# Patient Record
Sex: FEMALE | Race: WHITE | NOT HISPANIC OR LATINO | Employment: UNEMPLOYED | URBAN - METROPOLITAN AREA
[De-identification: names, ages, dates, MRNs, and addresses within clinical notes are randomized per-mention and may not be internally consistent; named-entity substitution may affect disease eponyms.]

---

## 2020-10-20 DIAGNOSIS — Z12.31 ENCOUNTER FOR SCREENING MAMMOGRAM FOR BREAST CANCER: Primary | ICD-10-CM

## 2022-08-24 ENCOUNTER — OFFICE VISIT (OUTPATIENT)
Dept: FAMILY MEDICINE | Facility: CLINIC | Age: 47
End: 2022-08-24
Payer: COMMERCIAL

## 2022-08-24 VITALS — SYSTOLIC BLOOD PRESSURE: 130 MMHG | DIASTOLIC BLOOD PRESSURE: 62 MMHG | WEIGHT: 184.38 LBS

## 2022-08-24 DIAGNOSIS — F17.200 TOBACCO DEPENDENCY: ICD-10-CM

## 2022-08-24 DIAGNOSIS — I10 HYPERTENSION, UNSPECIFIED TYPE: ICD-10-CM

## 2022-08-24 DIAGNOSIS — R05.3 CHRONIC COUGH: ICD-10-CM

## 2022-08-24 DIAGNOSIS — M79.605 LEG PAIN, BILATERAL: ICD-10-CM

## 2022-08-24 DIAGNOSIS — Z12.31 BREAST CANCER SCREENING BY MAMMOGRAM: ICD-10-CM

## 2022-08-24 DIAGNOSIS — Z00.00 ANNUAL PHYSICAL EXAM: Primary | ICD-10-CM

## 2022-08-24 DIAGNOSIS — M79.604 LEG PAIN, BILATERAL: ICD-10-CM

## 2022-08-24 DIAGNOSIS — Z12.11 COLON CANCER SCREENING: ICD-10-CM

## 2022-08-24 PROCEDURE — 3078F PR MOST RECENT DIASTOLIC BLOOD PRESSURE < 80 MM HG: ICD-10-PCS | Mod: CPTII,S$GLB,,

## 2022-08-24 PROCEDURE — 3078F DIAST BP <80 MM HG: CPT | Mod: CPTII,S$GLB,,

## 2022-08-24 PROCEDURE — 1159F PR MEDICATION LIST DOCUMENTED IN MEDICAL RECORD: ICD-10-PCS | Mod: CPTII,S$GLB,,

## 2022-08-24 PROCEDURE — 1160F RVW MEDS BY RX/DR IN RCRD: CPT | Mod: CPTII,S$GLB,,

## 2022-08-24 PROCEDURE — 1160F PR REVIEW ALL MEDS BY PRESCRIBER/CLIN PHARMACIST DOCUMENTED: ICD-10-PCS | Mod: CPTII,S$GLB,,

## 2022-08-24 PROCEDURE — 1159F MED LIST DOCD IN RCRD: CPT | Mod: CPTII,S$GLB,,

## 2022-08-24 PROCEDURE — 99386 PR PREVENTIVE VISIT,NEW,40-64: ICD-10-PCS | Mod: S$GLB,,,

## 2022-08-24 PROCEDURE — 4010F ACE/ARB THERAPY RXD/TAKEN: CPT | Mod: CPTII,S$GLB,,

## 2022-08-24 PROCEDURE — 3075F PR MOST RECENT SYSTOLIC BLOOD PRESS GE 130-139MM HG: ICD-10-PCS | Mod: CPTII,S$GLB,,

## 2022-08-24 PROCEDURE — 99386 PREV VISIT NEW AGE 40-64: CPT | Mod: S$GLB,,,

## 2022-08-24 PROCEDURE — 99999 PR PBB SHADOW E&M-EST. PATIENT-LVL III: ICD-10-PCS | Mod: PBBFAC,,,

## 2022-08-24 PROCEDURE — 99999 PR PBB SHADOW E&M-EST. PATIENT-LVL III: CPT | Mod: PBBFAC,,,

## 2022-08-24 PROCEDURE — 3075F SYST BP GE 130 - 139MM HG: CPT | Mod: CPTII,S$GLB,,

## 2022-08-24 PROCEDURE — 4010F PR ACE/ARB THEARPY RXD/TAKEN: ICD-10-PCS | Mod: CPTII,S$GLB,,

## 2022-08-24 RX ORDER — ALBUTEROL SULFATE 90 UG/1
2 AEROSOL, METERED RESPIRATORY (INHALATION) EVERY 6 HOURS PRN
Qty: 18 G | Refills: 2 | OUTPATIENT
Start: 2022-08-24 | End: 2023-08-20

## 2022-08-24 RX ORDER — LISINOPRIL AND HYDROCHLOROTHIAZIDE 10; 12.5 MG/1; MG/1
1 TABLET ORAL DAILY
COMMUNITY
Start: 2022-07-19 | End: 2022-08-24 | Stop reason: SDUPTHER

## 2022-08-24 RX ORDER — PNV NO.95/FERROUS FUM/FOLIC AC 28MG-0.8MG
100 TABLET ORAL DAILY
COMMUNITY

## 2022-08-24 RX ORDER — CYCLOBENZAPRINE HCL 10 MG
10 TABLET ORAL NIGHTLY PRN
Qty: 30 TABLET | Refills: 2 | Status: SHIPPED | OUTPATIENT
Start: 2022-08-24 | End: 2022-09-23

## 2022-08-24 RX ORDER — BUPRENORPHINE HYDROCHLORIDE 8 MG/1
8 TABLET SUBLINGUAL 3 TIMES DAILY
COMMUNITY
Start: 2022-08-09

## 2022-08-24 RX ORDER — CLONAZEPAM 1 MG/1
1 TABLET ORAL 2 TIMES DAILY
COMMUNITY
Start: 2022-08-09 | End: 2023-08-20

## 2022-08-24 RX ORDER — LISINOPRIL AND HYDROCHLOROTHIAZIDE 10; 12.5 MG/1; MG/1
1 TABLET ORAL DAILY
Qty: 90 TABLET | Refills: 3 | Status: SHIPPED | OUTPATIENT
Start: 2022-08-24 | End: 2023-07-28 | Stop reason: SDUPTHER
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
## 2022-09-22 ENCOUNTER — HOSPITAL ENCOUNTER (OUTPATIENT)
Dept: RADIOLOGY | Facility: HOSPITAL | Age: 47
Discharge: HOME | End: 2022-09-22
Payer: COMMERCIAL

## 2022-09-22 ENCOUNTER — CLINICAL SUPPORT (OUTPATIENT)
Dept: SMOKING CESSATION | Facility: CLINIC | Age: 47
End: 2022-09-22

## 2022-09-22 DIAGNOSIS — Z12.31 BREAST CANCER SCREENING BY MAMMOGRAM: ICD-10-CM

## 2022-09-22 DIAGNOSIS — F17.210 HEAVY SMOKER (MORE THAN 20 CIGARETTES PER DAY): Primary | ICD-10-CM

## 2022-09-22 PROCEDURE — 77063 MAMMO DIGITAL SCREENING BILAT WITH TOMO: ICD-10-PCS | Mod: 26,,, | Performed by: RADIOLOGY

## 2022-09-22 PROCEDURE — 77063 BREAST TOMOSYNTHESIS BI: CPT | Mod: TC

## 2022-09-22 PROCEDURE — 77067 SCR MAMMO BI INCL CAD: CPT | Mod: 26,,, | Performed by: RADIOLOGY

## 2022-09-22 PROCEDURE — 77067 MAMMO DIGITAL SCREENING BILAT WITH TOMO: ICD-10-PCS | Mod: 26,,, | Performed by: RADIOLOGY

## 2022-09-22 PROCEDURE — 99404 PR PREVENT COUNSEL,INDIV,60 MIN: ICD-10-PCS | Mod: S$GLB,,, | Performed by: NURSE PRACTITIONER

## 2022-09-22 PROCEDURE — 99404 PREV MED CNSL INDIV APPRX 60: CPT | Mod: S$GLB,,, | Performed by: NURSE PRACTITIONER

## 2022-09-22 PROCEDURE — 77063 BREAST TOMOSYNTHESIS BI: CPT | Mod: 26,,, | Performed by: RADIOLOGY

## 2022-09-22 RX ORDER — IBUPROFEN 200 MG
1 TABLET ORAL DAILY
Qty: 28 PATCH | Refills: 0 | Status: SHIPPED | OUTPATIENT
Start: 2022-09-22 | End: 2023-08-17

## 2022-09-22 RX ORDER — VARENICLINE TARTRATE 1 MG/1
1 TABLET, FILM COATED ORAL 2 TIMES DAILY
Qty: 56 TABLET | Refills: 0 | Status: SHIPPED | OUTPATIENT
Start: 2022-09-22

## 2022-09-22 NOTE — Clinical Note
Patient seen with her friend at her side for her intake appointment with smoking cessation. They are trying to stop smoking together This is her first attempt to stop smoking through our program. She is a cigarette smoker of 2 PPD X 41 years. She is motivated to quit the use of tobacco and has agreed to attend our 6 week tobacco cessation program.

## 2022-09-22 NOTE — PROGRESS NOTES
Patient and her friend, Dagmar are attempting to stop smoking together. They were seen together for the intake appointment with smoking cessation.

## 2022-09-23 ENCOUNTER — TELEPHONE (OUTPATIENT)
Dept: FAMILY MEDICINE | Facility: CLINIC | Age: 47
End: 2022-09-23
Payer: COMMERCIAL

## 2022-09-23 NOTE — TELEPHONE ENCOUNTER
----- Message from Genny Glynn NP sent at 9/22/2022  5:13 PM CDT -----   Your mammogram was normal. Recommended to repeat in one year.     Have a great day!    FARRAH Horan

## 2022-09-27 ENCOUNTER — TELEPHONE (OUTPATIENT)
Dept: FAMILY MEDICINE | Facility: CLINIC | Age: 47
End: 2022-09-27
Payer: COMMERCIAL

## 2022-10-06 ENCOUNTER — CLINICAL SUPPORT (OUTPATIENT)
Dept: SMOKING CESSATION | Facility: CLINIC | Age: 47
End: 2022-10-06

## 2022-10-06 DIAGNOSIS — F17.210 HEAVY SMOKER (MORE THAN 20 CIGARETTES PER DAY): Primary | ICD-10-CM

## 2022-10-06 PROCEDURE — 99402 PR PREVENT COUNSEL,INDIV,30 MIN: ICD-10-PCS | Mod: S$GLB,,, | Performed by: NURSE PRACTITIONER

## 2022-10-06 PROCEDURE — 99402 PREV MED CNSL INDIV APPRX 30: CPT | Mod: S$GLB,,, | Performed by: NURSE PRACTITIONER

## 2022-10-06 NOTE — Clinical Note
Patient reports smoking about the same, 2 packs per day. She has started taking Chantix this week and should be cutting back soon. We discussed and reviewed strategies, cues, and triggers. Introduced the negative impact of tobacco on health, the health advantages of discontinuing the use of tobacco, time line improved health changes after a quit, withdrawal issues to expect from nicotine and habit, and ways to achieve the goal of a quit. The patient remains on the prescribed tobacco cessation medication regimen of 1 mg Chantix BID without any negative side effects at this time. The patient denies any abnormal behavioral or mental changes at this time. The patient will continue with therapy sessions and medication monitoring by CTTS. Prescribed medication management will be by physician.

## 2022-10-06 NOTE — PROGRESS NOTES
Individual Follow-Up Form    10/6/2022    Quit Date: none    Clinical Status of Patient: Outpatient    Length of Service: 30 minutes    Continuing Medication: yes  Chantix    Other Medications: none     Target Symptoms: Withdrawal and medication side effects. The following were rated moderate (3) to severe (4) on TCRS:  Moderate (3): none  Severe (4): none    Comments: #1 Patient reports smoking about the same, 2 packs per day. She has started taking Chantix this week and should be cutting back soon. We discussed and reviewed strategies, cues, and triggers. Introduced the negative impact of tobacco on health, the health advantages of discontinuing the use of tobacco, time line improved health changes after a quit, withdrawal issues to expect from nicotine and habit, and ways to achieve the goal of a quit. The patient remains on the prescribed tobacco cessation medication regimen of 1 mg Chantix BID without any negative side effects at this time. The patient denies any abnormal behavioral or mental changes at this time. The patient will continue with therapy sessions and medication monitoring by CTTS. Prescribed medication management will be by physician.     Diagnosis: F17.210    Next Visit: 1 week

## 2022-10-13 ENCOUNTER — CLINICAL SUPPORT (OUTPATIENT)
Dept: SMOKING CESSATION | Facility: CLINIC | Age: 47
End: 2022-10-13

## 2022-10-13 DIAGNOSIS — F17.210 MODERATE SMOKER (20 OR LESS PER DAY): Primary | ICD-10-CM

## 2022-10-13 PROCEDURE — 99402 PR PREVENT COUNSEL,INDIV,30 MIN: ICD-10-PCS | Mod: S$GLB,,, | Performed by: NURSE PRACTITIONER

## 2022-10-13 PROCEDURE — 99402 PREV MED CNSL INDIV APPRX 30: CPT | Mod: S$GLB,,, | Performed by: NURSE PRACTITIONER

## 2022-10-13 NOTE — PROGRESS NOTES
Individual Follow-Up Form    10/13/2022    Quit Date: none    Clinical Status of Patient: Outpatient    Length of Service: 30 minutes    Continuing Medication: yes  Chantix or Patches    Other Medications: none     Target Symptoms: Withdrawal and medication side effects. The following were rated moderate (3) to severe (4) on TCRS:  Moderate (3): none  Severe (4): none    Comments:  #2 We discussed and reviewed strategies, cues, and triggers. Introduced the negative impact of tobacco on health, the health advantages of discontinuing the use of tobacco, time line improved health changes after a quit, withdrawal issues to expect from nicotine and habit, and ways to achieve the goal of a quit. The patient remains on the prescribed tobacco cessation medication regimen of 1 mg Chantix BID with a 21 mg nicotine patch QD without any negative side effects at this time. The patient denies any abnormal behavioral or mental changes at this time. The patient will continue with therapy sessions and medication monitoring by CTTS. Prescribed medication management will be by physician.        Diagnosis: F17.210    Next Visit: 1 week

## 2022-10-13 NOTE — Clinical Note
We discussed and reviewed strategies, cues, and triggers. Introduced the negative impact of tobacco on health, the health advantages of discontinuing the use of tobacco, time line improved health changes after a quit, withdrawal issues to expect from nicotine and habit, and ways to achieve the goal of a quit. The patient remains on the prescribed tobacco cessation medication regimen of 1 mg Chantix BID with a 21 mg nicotine patch QD without any negative side effects at this time. The patient denies any abnormal behavioral or mental changes at this time. The patient will continue with therapy sessions and medication monitoring by CTTS. Prescribed medication management will be by physician.

## 2022-10-20 ENCOUNTER — TELEPHONE (OUTPATIENT)
Dept: SMOKING CESSATION | Facility: CLINIC | Age: 47
End: 2022-10-20
Payer: COMMERCIAL

## 2022-11-02 ENCOUNTER — TELEPHONE (OUTPATIENT)
Dept: SMOKING CESSATION | Facility: CLINIC | Age: 47
End: 2022-11-02
Payer: COMMERCIAL

## 2022-11-02 NOTE — TELEPHONE ENCOUNTER
"Attempted to contact patient to follow up with smoking cessation. Her phone states, "Voicemail is disabled, you cannot leave a message."  " no

## 2022-11-30 ENCOUNTER — TELEPHONE (OUTPATIENT)
Dept: SMOKING CESSATION | Facility: CLINIC | Age: 47
End: 2022-11-30
Payer: COMMERCIAL

## 2022-11-30 NOTE — TELEPHONE ENCOUNTER
Attempted to contact patient to follow up with smoking. She voicemail is disabled. You can not leave a message.

## 2023-01-04 ENCOUNTER — HOSPITAL ENCOUNTER (EMERGENCY)
Facility: HOSPITAL | Age: 48
Discharge: HOME | End: 2023-01-04
Payer: COMMERCIAL

## 2023-01-04 VITALS
SYSTOLIC BLOOD PRESSURE: 126 MMHG | HEIGHT: 64 IN | HEART RATE: 88 BPM | BODY MASS INDEX: 29.02 KG/M2 | TEMPERATURE: 98 F | DIASTOLIC BLOOD PRESSURE: 68 MMHG | RESPIRATION RATE: 20 BRPM | WEIGHT: 170 LBS | OXYGEN SATURATION: 97 %

## 2023-01-04 DIAGNOSIS — R21 RASH AND NONSPECIFIC SKIN ERUPTION: Primary | ICD-10-CM

## 2023-01-04 DIAGNOSIS — B35.4 RINGWORM, BODY: ICD-10-CM

## 2023-01-04 PROCEDURE — 99284 EMERGENCY DEPT VISIT MOD MDM: CPT

## 2023-01-04 PROCEDURE — 87389 HIV-1 AG W/HIV-1&-2 AB AG IA: CPT | Performed by: NURSE PRACTITIONER

## 2023-01-04 PROCEDURE — 96372 THER/PROPH/DIAG INJ SC/IM: CPT | Performed by: NURSE PRACTITIONER

## 2023-01-04 PROCEDURE — 63600175 PHARM REV CODE 636 W HCPCS: Performed by: NURSE PRACTITIONER

## 2023-01-04 PROCEDURE — 86803 HEPATITIS C AB TEST: CPT | Performed by: NURSE PRACTITIONER

## 2023-01-04 PROCEDURE — 36415 COLL VENOUS BLD VENIPUNCTURE: CPT | Performed by: NURSE PRACTITIONER

## 2023-01-04 RX ORDER — METHYLPREDNISOLONE 4 MG/1
TABLET ORAL
Qty: 21 EACH | Refills: 0 | Status: SHIPPED | OUTPATIENT
Start: 2023-01-04 | End: 2023-01-25

## 2023-01-04 RX ORDER — CLOTRIMAZOLE 1 %
CREAM (GRAM) TOPICAL 2 TIMES DAILY
Qty: 20 G | Refills: 0 | Status: SHIPPED | OUTPATIENT
Start: 2023-01-04

## 2023-01-04 RX ORDER — DEXAMETHASONE SODIUM PHOSPHATE 10 MG/ML
6 INJECTION INTRAMUSCULAR; INTRAVENOUS
Status: COMPLETED | OUTPATIENT
Start: 2023-01-04 | End: 2023-01-04

## 2023-01-04 RX ADMIN — DEXAMETHASONE SODIUM PHOSPHATE 6 MG: 10 INJECTION INTRAMUSCULAR; INTRAVENOUS at 07:01

## 2023-01-05 ENCOUNTER — TELEPHONE (OUTPATIENT)
Dept: SMOKING CESSATION | Facility: CLINIC | Age: 48
End: 2023-01-05
Payer: COMMERCIAL

## 2023-01-05 LAB
HCV AB SERPL QL IA: NORMAL
HIV 1+2 AB+HIV1 P24 AG SERPL QL IA: NORMAL

## 2023-01-05 NOTE — TELEPHONE ENCOUNTER
First attempt regarding smoking cessation quit 1 episode, unable to leave message due to no answering service available.

## 2023-01-05 NOTE — ED PROVIDER NOTES
Encounter Date: 1/4/2023       History     Chief Complaint   Patient presents with    Rash     Rash to trunk. Red, raised circular areas. Change in laundry detergent.     Patient is a 47 year female presents emergency room with scattered generalized rash to the abdomen progressing down into groin, upper chest, upper back, and a ringworm to the left chest.  Patient states she noted that she was having a ringworm proximally for 5 days ago, and rash has been progressive getting worse over the past 4-5 days.  Patient states she is been taking Benadryl, 25 mg at a time, for the past 4 days.  Patient states something she is changes laundry detergent, but that was back around [place] time.  Patient states she states she is got a ringworm from her daughter who had several ringworm secondary to having cats.  She denies any fevers, chills, nausea, vomiting, diarrhea, chest pain or shortness of breath.    Review of patient's allergies indicates:   Allergen Reactions    Sulfate ion Swelling    Codeine      History reviewed. No pertinent past medical history.  Past Surgical History:   Procedure Laterality Date    tuballigation       Family History   Problem Relation Age of Onset    Breast cancer Maternal Aunt      Social History     Tobacco Use    Smoking status: Every Day     Packs/day: 0.50     Years: 33.00     Pack years: 16.50     Types: Cigarettes    Smokeless tobacco: Never    Tobacco comments:     10/6/22 Active participant with smoking cessation.   Substance Use Topics    Alcohol use: Not Currently    Drug use: Never     Review of Systems   Constitutional: Negative.    HENT: Negative.     Eyes: Negative.    Respiratory: Negative.     Cardiovascular: Negative.    Gastrointestinal: Negative.    Endocrine: Negative.    Genitourinary: Negative.    Musculoskeletal: Negative.    Skin:  Positive for rash.   Allergic/Immunologic: Negative for food allergies.   Neurological: Negative.    Hematological: Negative.     Psychiatric/Behavioral: Negative.     All other systems reviewed and are negative.    Physical Exam     Initial Vitals [01/04/23 1834]   BP Pulse Resp Temp SpO2   126/68 88 20 98.4 °F (36.9 °C) 97 %      MAP       --         Physical Exam    Nursing note and vitals reviewed.  Constitutional: She appears well-nourished. She is not diaphoretic. No distress.   HENT:   Head: Normocephalic and atraumatic.   Mouth/Throat: Oropharynx is clear and moist.   Eyes: EOM are normal. Pupils are equal, round, and reactive to light. No scleral icterus.   Neck:   Normal range of motion.  Cardiovascular:  Normal rate, regular rhythm, normal heart sounds and intact distal pulses.           No murmur heard.  Pulmonary/Chest: Breath sounds normal. No respiratory distress. She has no wheezes. She has no rhonchi. She has no rales.   Musculoskeletal:         General: Normal range of motion.      Cervical back: Normal range of motion.     Neurological: She is alert and oriented to person, place, and time. She has normal strength. No sensory deficit. GCS score is 15. GCS eye subscore is 4. GCS verbal subscore is 5. GCS motor subscore is 6.   Skin: Skin is warm and dry. Capillary refill takes 2 to 3 seconds. Rash (scattered throughout abdomen, upper chest, mid back, down into the groin.  Patient also has ringworm to the left upper chest.) noted.   Psychiatric: She has a normal mood and affect.       ED Course   Procedures  Labs Reviewed   HIV 1 / 2 ANTIBODY   HEPATITIS C ANTIBODY          Imaging Results    None          Medications   dexAMETHasone sodium phos (PF) injection 6 mg (has no administration in time range)     Medical Decision Making:   Initial Assessment:   Patient seen examined emergency room.  No acute distress this time.  Has no signs symptoms of respiratory distress.  Detailed assessment as noted above.  Differential Diagnosis:   Rash, ringworm, cellulitis, yeast infection  ED Management:  After patient seen examined  emergency room.  She was given 6 mg of Decadron IM.  Will prescribe the patient a Medrol Dosepak for rash, and Lotrimin cream for ringworm.  Advised patient to take medications as prescribed.  Follow up with the primary care provider if no improvement in a week.  Return emergency room if symptoms worsens, she develops any signs symptoms shortness of breath, or any other worrisome symptoms.                        Clinical Impression:   Final diagnoses:  [R21] Rash and nonspecific skin eruption (Primary)  [B35.4] Ringworm, body        ED Disposition Condition    Discharge Stable          ED Prescriptions       Medication Sig Dispense Start Date End Date Auth. Provider    clotrimazole (LOTRIMIN) 1 % cream Apply topically 2 (two) times daily. For 2 weeks 20 g 1/4/2023 -- Filippo Graff NP    methylPREDNISolone (MEDROL DOSEPACK) 4 mg tablet use as directed 21 each 1/4/2023 1/25/2023 Filippo Graff NP          Follow-up Information    None          Filippo Graff NP  01/04/23 1921

## 2023-01-05 NOTE — ED NOTES
Pt states that she has a generalized rash that is on her abd area, chest area and back area. Pt states that this rash has been going on for the last 4 to 5 days. Pt states that it itches very much. Pt states that she is not sure if this is due to changing laundry soap. Pt states that she has been taking benadryl around the clock. Pt states that she took 25mg about an hr prior to arrival. Pt states that she also has a ring worm on her left chest wall area. Pt states that she is not having any other symptoms at this time.

## 2023-01-05 NOTE — DISCHARGE INSTRUCTIONS
Use medications as prescribed.    Follow up with the primary care provider if no improvement in a week.  Return emergency room if symptoms worsens, she develops any signs symptoms shortness of breath, or any other worrisome symptoms.

## 2023-01-11 DIAGNOSIS — I10 HYPERTENSION: ICD-10-CM

## 2023-02-23 ENCOUNTER — OFFICE VISIT (OUTPATIENT)
Dept: FAMILY MEDICINE | Facility: CLINIC | Age: 48
End: 2023-02-23
Payer: COMMERCIAL

## 2023-02-23 VITALS
BODY MASS INDEX: 33.83 KG/M2 | HEART RATE: 70 BPM | RESPIRATION RATE: 15 BRPM | SYSTOLIC BLOOD PRESSURE: 126 MMHG | OXYGEN SATURATION: 96 % | DIASTOLIC BLOOD PRESSURE: 80 MMHG | WEIGHT: 198.13 LBS | HEIGHT: 64 IN

## 2023-02-23 DIAGNOSIS — Z13.1 SCREENING FOR DIABETES MELLITUS (DM): ICD-10-CM

## 2023-02-23 DIAGNOSIS — M54.42 CHRONIC LOW BACK PAIN WITH LEFT-SIDED SCIATICA, UNSPECIFIED BACK PAIN LATERALITY: Primary | ICD-10-CM

## 2023-02-23 DIAGNOSIS — Z13.29 SCREENING FOR THYROID DISORDER: ICD-10-CM

## 2023-02-23 DIAGNOSIS — G89.29 CHRONIC LOW BACK PAIN WITH LEFT-SIDED SCIATICA, UNSPECIFIED BACK PAIN LATERALITY: Primary | ICD-10-CM

## 2023-02-23 PROCEDURE — 3079F DIAST BP 80-89 MM HG: CPT | Mod: CPTII,S$GLB,,

## 2023-02-23 PROCEDURE — 1159F PR MEDICATION LIST DOCUMENTED IN MEDICAL RECORD: ICD-10-PCS | Mod: CPTII,S$GLB,,

## 2023-02-23 PROCEDURE — 3008F BODY MASS INDEX DOCD: CPT | Mod: CPTII,S$GLB,,

## 2023-02-23 PROCEDURE — 3074F PR MOST RECENT SYSTOLIC BLOOD PRESSURE < 130 MM HG: ICD-10-PCS | Mod: CPTII,S$GLB,,

## 2023-02-23 PROCEDURE — 3079F PR MOST RECENT DIASTOLIC BLOOD PRESSURE 80-89 MM HG: ICD-10-PCS | Mod: CPTII,S$GLB,,

## 2023-02-23 PROCEDURE — 99999 PR PBB SHADOW E&M-EST. PATIENT-LVL IV: CPT | Mod: PBBFAC,,,

## 2023-02-23 PROCEDURE — 1159F MED LIST DOCD IN RCRD: CPT | Mod: CPTII,S$GLB,,

## 2023-02-23 PROCEDURE — 99213 OFFICE O/P EST LOW 20 MIN: CPT | Mod: S$GLB,,,

## 2023-02-23 PROCEDURE — 3074F SYST BP LT 130 MM HG: CPT | Mod: CPTII,S$GLB,,

## 2023-02-23 PROCEDURE — 3008F PR BODY MASS INDEX (BMI) DOCUMENTED: ICD-10-PCS | Mod: CPTII,S$GLB,,

## 2023-02-23 PROCEDURE — 99213 PR OFFICE/OUTPT VISIT, EST, LEVL III, 20-29 MIN: ICD-10-PCS | Mod: S$GLB,,,

## 2023-02-23 PROCEDURE — 99999 PR PBB SHADOW E&M-EST. PATIENT-LVL IV: ICD-10-PCS | Mod: PBBFAC,,,

## 2023-02-23 RX ORDER — PREDNISONE 20 MG/1
20 TABLET ORAL DAILY
Qty: 5 TABLET | Refills: 0 | Status: SHIPPED | OUTPATIENT
Start: 2023-02-23 | End: 2023-08-17

## 2023-02-23 RX ORDER — CYCLOBENZAPRINE HCL 10 MG
10 TABLET ORAL 3 TIMES DAILY PRN
Qty: 30 TABLET | Refills: 0 | Status: SHIPPED | OUTPATIENT
Start: 2023-02-23 | End: 2023-03-05

## 2023-02-23 NOTE — PROGRESS NOTES
"[facility] - Clinic Visit Note    Subjective:           Chief Complaint: Follow-up (Left leg pain)    Ladonna Meraz is a 48 y.o. female presenting to clinic today with complaints of acute exacerbation of chronic lower back pain with left sided sciatica. Reports having trouble walking.   C/o weight gain, due to acess calories around the holidays.   Due for annual blood work.       Review of Systems   Constitutional:  Positive for appetite change, fatigue and unexpected weight change. Negative for chills and fever.   HENT:  Negative for congestion.    Respiratory:  Negative for cough and shortness of breath.    Cardiovascular:  Negative for chest pain and leg swelling.   Gastrointestinal:  Negative for abdominal pain, constipation, diarrhea, nausea and vomiting.   Genitourinary:  Negative for difficulty urinating.   Musculoskeletal:  Positive for back pain.   Neurological:  Negative for dizziness and headaches.   All other systems reviewed and are negative.        Objective:      /80 (BP Location: Left arm, Patient Position: Sitting, BP Method: Medium (Manual))   Pulse 70   Resp 15   Ht 5' 4" (1.626 m)   Wt 89.9 kg (198 lb 1.6 oz)   SpO2 96%   BMI 34.00 kg/m²   Physical Exam  Vitals and nursing note reviewed.   Constitutional:       General: She is in acute distress.      Appearance: Normal appearance. She is not ill-appearing.   HENT:      Head: Normocephalic and atraumatic.      Nose: Nose normal.   Eyes:      Pupils: Pupils are equal, round, and reactive to light.   Cardiovascular:      Rate and Rhythm: Normal rate and regular rhythm.      Heart sounds: Normal heart sounds.   Pulmonary:      Effort: Pulmonary effort is normal.      Breath sounds: Normal breath sounds.   Abdominal:      General: Abdomen is flat.   Musculoskeletal:         General: Normal range of motion.      Cervical back: Normal range of motion.   Skin:     General: Skin is warm and dry.   Neurological:      Mental Status: She is " alert and oriented to person, place, and time.   Psychiatric:         Mood and Affect: Mood normal.         Behavior: Behavior normal.         Thought Content: Thought content normal.         Judgment: Judgment normal.       Assessment and Plan:       1. Chronic low back pain with left-sided sciatica, unspecified back pain laterality  -     predniSONE (DELTASONE) 20 MG tablet; Take 1 tablet (20 mg total) by mouth once daily.  Dispense: 5 tablet; Refill: 0  -     cyclobenzaprine (FLEXERIL) 10 MG tablet; Take 1 tablet (10 mg total) by mouth 3 (three) times daily as needed for Muscle spasms.  Dispense: 30 tablet; Refill: 0    2. Screening for diabetes mellitus (DM)  -     Lipid Panel; Future; Expected date: 02/23/2023  -     Hemoglobin A1C; Future; Expected date: 02/23/2023  -     CBC Auto Differential; Future; Expected date: 02/23/2023  -     Comprehensive Metabolic Panel; Future; Expected date: 02/23/2023  -     TSH; Future; Expected date: 02/23/2023  -     T4, Free; Future; Expected date: 02/23/2023    3. Screening for thyroid disorder  -     TSH; Future; Expected date: 02/23/2023  -     T4, Free; Future; Expected date: 02/23/2023        PLAN: Applies to all diagnosis and orders listed above.  - fasting bw ordered  - 5 days of prednisone  - continue with Naproxen  - Flexeril TID PRN  - Follow up if symptoms worsen or fail to improve.     Discussed with patient the plan of care. Medications reviewed. Medication side effects discussed. Patient has no questions or concerns at this time. Reviewed, monitored, evaluated, and assessed chronic conditions as outlined above. Reviewed family, medical, surgical, and social history. Reviewed and discussed labs and imaging from the last 3 months.  Informed patient to please notify me with any questions or concerns at anytime.    Thank you,  BETSY Horan  Family Medicine [facility]

## 2023-03-28 ENCOUNTER — CLINICAL SUPPORT (OUTPATIENT)
Dept: SMOKING CESSATION | Facility: CLINIC | Age: 48
End: 2023-03-28

## 2023-03-28 DIAGNOSIS — F17.200 NICOTINE DEPENDENCE: Primary | ICD-10-CM

## 2023-03-28 PROCEDURE — 99999 PR PBB SHADOW E&M-EST. PATIENT-LVL I: CPT | Mod: PBBFAC,,,

## 2023-03-28 PROCEDURE — 99999 PR PBB SHADOW E&M-EST. PATIENT-LVL I: ICD-10-PCS | Mod: PBBFAC,,,

## 2023-03-28 PROCEDURE — 99407 BEHAV CHNG SMOKING > 10 MIN: CPT | Mod: S$GLB,,,

## 2023-03-28 PROCEDURE — 99407 PR TOBACCO USE CESSATION INTENSIVE >10 MINUTES: ICD-10-PCS | Mod: S$GLB,,,

## 2023-03-28 NOTE — PROGRESS NOTES
Spoke with patient today in regard to smoking cessation progress for 6 month telephone follow up, she states not tobacco free. Patient states not ready to return to the program at this time and would like to be called at a later date and time. Informed patient of benefit period, future follow up, and contact information if any further help or support is needed. Will complete smart form for 3 and 6 month follow up on Quit attempt #1.

## 2023-05-29 ENCOUNTER — TELEPHONE (OUTPATIENT)
Dept: FAMILY MEDICINE | Facility: CLINIC | Age: 48
End: 2023-05-29
Payer: COMMERCIAL

## 2023-05-29 NOTE — TELEPHONE ENCOUNTER
Called pt to let them know that Dr. Rodrigez will be leaving the practice. Offering them to see another provider to establish care or they can come in if they still need to be seen.     LVM

## 2023-07-28 DIAGNOSIS — I10 HYPERTENSION, UNSPECIFIED TYPE: ICD-10-CM

## 2023-07-28 RX ORDER — LISINOPRIL AND HYDROCHLOROTHIAZIDE 10; 12.5 MG/1; MG/1
1 TABLET ORAL DAILY
Qty: 90 TABLET | Refills: 3 | Status: SHIPPED | OUTPATIENT
Start: 2023-07-28 | End: 2023-08-25 | Stop reason: SDUPTHER

## 2023-07-28 NOTE — TELEPHONE ENCOUNTER
----- Message from Ke Morrell sent at 7/27/2023  3:52 PM CDT -----  Contact: self  Type:  Sooner Appointment Request    Caller is requesting a sooner appointment.  Caller declined first available appointment listed below.  Caller will not accept being placed on the waitlist and is requesting a message be sent to doctor.    Name of Caller:  pt  When is the first available appointment?  N/a  Symptoms:  est care  Best Call Back Number:  [phone]  Additional Information:  Pt would like to schedule appt to est care with new provider  Thank you

## 2023-07-28 NOTE — TELEPHONE ENCOUNTER
----- Message from Ke Morrell sent at 7/27/2023  4:09 PM CDT -----  Contact: self  Type:  RX Refill Request    Who Called:  pt  Refill or New Rx:  refill  RX Name and Strength:  lisinopriL-hydrochlorothiazide (PRINZIDE,ZESTORETIC) 10-12.5 mg per tablet  How is the patient currently taking it? (ex. 1XDay):   Take 1 tablet by mouth once daily. - Oral  Is this a 30 day or 90 day RX:  90  Preferred Pharmacy with phone number:    [facility]/pharmacy #[id number] - [place], [place] - [address]  [address] [place] [address]  Phone: [phone] Fax: [phone]      Local or Mail Order:  local  Ordering Provider:  Genny Glynn NP  Best Call Back Number:  [phone]  Additional Information:  Thank you

## 2023-07-28 NOTE — TELEPHONE ENCOUNTER
Attempted to contact Ladonna Meraz to discuss Scheduling an appointment.    Left voice mail to return our call at [phone] on [phone] (home).    Mabel Hilliard LPN

## 2023-08-17 ENCOUNTER — OFFICE VISIT (OUTPATIENT)
Dept: FAMILY MEDICINE | Facility: CLINIC | Age: 48
End: 2023-08-17
Payer: COMMERCIAL

## 2023-08-17 VITALS
HEART RATE: 101 BPM | WEIGHT: 195.38 LBS | HEIGHT: 64 IN | OXYGEN SATURATION: 98 % | BODY MASS INDEX: 33.35 KG/M2 | RESPIRATION RATE: 18 BRPM | TEMPERATURE: 98 F | DIASTOLIC BLOOD PRESSURE: 80 MMHG | SYSTOLIC BLOOD PRESSURE: 132 MMHG

## 2023-08-17 DIAGNOSIS — Z00.00 ANNUAL PHYSICAL EXAM: Primary | ICD-10-CM

## 2023-08-17 DIAGNOSIS — M54.41 CHRONIC RIGHT-SIDED LOW BACK PAIN WITH RIGHT-SIDED SCIATICA: ICD-10-CM

## 2023-08-17 DIAGNOSIS — G89.29 CHRONIC RIGHT-SIDED LOW BACK PAIN WITH RIGHT-SIDED SCIATICA: ICD-10-CM

## 2023-08-17 DIAGNOSIS — R60.0 LOCALIZED EDEMA: ICD-10-CM

## 2023-08-17 PROCEDURE — 4010F ACE/ARB THERAPY RXD/TAKEN: CPT | Mod: CPTII,S$GLB,, | Performed by: FAMILY MEDICINE

## 2023-08-17 PROCEDURE — 96372 THER/PROPH/DIAG INJ SC/IM: CPT | Mod: S$GLB,,, | Performed by: FAMILY MEDICINE

## 2023-08-17 PROCEDURE — 96372 THER/PROPH/DIAG INJ SC/IM: CPT | Mod: PBBFAC

## 2023-08-17 PROCEDURE — 99999 PR PBB SHADOW E&M-EST. PATIENT-LVL V: CPT | Mod: PBBFAC,,, | Performed by: FAMILY MEDICINE

## 2023-08-17 PROCEDURE — 99396 PREV VISIT EST AGE 40-64: CPT | Mod: 25,S$GLB,, | Performed by: FAMILY MEDICINE

## 2023-08-17 PROCEDURE — 99999 PR PBB SHADOW E&M-EST. PATIENT-LVL V: ICD-10-PCS | Mod: PBBFAC,,, | Performed by: FAMILY MEDICINE

## 2023-08-17 PROCEDURE — 96372 PR INJECTION,THERAP/PROPH/DIAG2ST, IM OR SUBCUT: ICD-10-PCS | Mod: S$GLB,,, | Performed by: FAMILY MEDICINE

## 2023-08-17 PROCEDURE — 99215 OFFICE O/P EST HI 40 MIN: CPT | Mod: PBBFAC | Performed by: FAMILY MEDICINE

## 2023-08-17 PROCEDURE — 4010F PR ACE/ARB THEARPY RXD/TAKEN: ICD-10-PCS | Mod: CPTII,S$GLB,, | Performed by: FAMILY MEDICINE

## 2023-08-17 PROCEDURE — 99396 PR PREVENTIVE VISIT,EST,40-64: ICD-10-PCS | Mod: 25,S$GLB,, | Performed by: FAMILY MEDICINE

## 2023-08-17 RX ORDER — DEXAMETHASONE SODIUM PHOSPHATE 4 MG/ML
4 INJECTION, SOLUTION INTRA-ARTICULAR; INTRALESIONAL; INTRAMUSCULAR; INTRAVENOUS; SOFT TISSUE
Status: COMPLETED | OUTPATIENT
Start: 2023-08-17 | End: 2023-08-17

## 2023-08-17 RX ADMIN — DEXAMETHASONE SODIUM PHOSPHATE 4 MG: 4 INJECTION, SOLUTION INTRA-ARTICULAR; INTRALESIONAL; INTRAMUSCULAR; INTRAVENOUS; SOFT TISSUE at 02:08

## 2023-08-18 RX ORDER — CYCLOBENZAPRINE HCL 10 MG
10 TABLET ORAL 3 TIMES DAILY PRN
Qty: 30 TABLET | Refills: 1 | Status: SHIPPED | OUTPATIENT
Start: 2023-08-18

## 2023-08-18 RX ORDER — DICLOFENAC SODIUM 75 MG/1
75 TABLET, DELAYED RELEASE ORAL 2 TIMES DAILY PRN
Qty: 60 TABLET | Refills: 1 | Status: SHIPPED | OUTPATIENT
Start: 2023-08-18

## 2023-08-18 NOTE — PROGRESS NOTES
"[facility] - Clinic Note    Subjective      Ms. Meraz is a 48 y.o. female who presents to clinic for Establish Care    Patient reports swelling in both her legs.  She also reports worsening lower back pain.    PMH Ladonna has no past medical history on file.   PSXH Ladonna has a past surgical history that includes tuballigation.    Ladonna's family history includes Breast cancer in her maternal aunt.   SH Ladonna reports that she has been smoking cigarettes. She has a 16.5 pack-year smoking history. She has never used smokeless tobacco. She reports that she does not currently use alcohol. She reports that she does not use drugs.   ALG Ladonna is allergic to sulfate ion and codeine.   MED Ladonna has a current medication list which includes the following prescription(s): buprenorphine hcl, clonazepam, clotrimazole, cyanocobalamin, lisinopril-hydrochlorothiazide, varenicline, albuterol, cyclobenzaprine, and diclofenac.     Review of Systems   Constitutional:  Negative for chills and fever.   HENT:  Negative for congestion and rhinorrhea.    Eyes:  Negative for visual disturbance.   Respiratory:  Negative for cough and shortness of breath.    Cardiovascular:  Positive for leg swelling. Negative for chest pain.   Gastrointestinal:  Negative for abdominal pain, constipation, diarrhea, nausea and vomiting.   Genitourinary:  Negative for dysuria.   Musculoskeletal:  Positive for back pain. Negative for myalgias.   Skin:  Negative for rash.   Neurological:  Negative for weakness and headaches.     Objective     /80 (BP Location: Left arm, Patient Position: Sitting, BP Method: Large (Manual))   Pulse 101   Temp 97.8 °F (36.6 °C) (Temporal)   Resp 18   Ht 5' 4" (1.626 m)   Wt 88.6 kg (195 lb 6.4 oz)   SpO2 98%   BMI 33.54 kg/m²     Physical Exam  Vitals and nursing note reviewed.   Constitutional:       General: She is not in acute distress.     Appearance: Normal appearance. She is well-developed. She is not " diaphoretic.   HENT:      Head: Normocephalic and atraumatic.      Right Ear: External ear normal.      Left Ear: External ear normal.   Eyes:      General:         Right eye: No discharge.         Left eye: No discharge.   Cardiovascular:      Rate and Rhythm: Normal rate and regular rhythm.      Heart sounds: Normal heart sounds.   Pulmonary:      Effort: Pulmonary effort is normal.      Breath sounds: Normal breath sounds. No wheezing or rales.   Musculoskeletal:      Right lower leg: Edema present.      Left lower leg: Edema present.   Skin:     General: Skin is warm and dry.   Neurological:      Mental Status: She is alert and oriented to person, place, and time. Mental status is at baseline.   Psychiatric:         Mood and Affect: Mood normal.         Behavior: Behavior normal.         Thought Content: Thought content normal.         Judgment: Judgment normal.        Assessment/Plan     1. Annual physical exam  Lipid Panel    Hemoglobin A1C      2. Localized edema  Comprehensive Metabolic Panel    CBC Auto Differential    Brain natriuretic peptide    TSH      3. Chronic right-sided low back pain with right-sided sciatica  Ambulatory referral/consult to Back & Spine Clinic    dexAMETHasone injection 4 mg    diclofenac (VOLTAREN) 75 MG EC tablet    cyclobenzaprine (FLEXERIL) 10 MG tablet        Given history will refer to back and spine clinic.  Decadron injection today.  Diclofenac and Flexeril as needed.  Check labs as above.    No future appointments.      Yoseph Martinez MD  Family Medicine  [facility]

## 2023-08-20 ENCOUNTER — HOSPITAL ENCOUNTER (EMERGENCY)
Facility: HOSPITAL | Age: 48
Discharge: HOME | End: 2023-08-20
Attending: EMERGENCY MEDICINE
Payer: COMMERCIAL

## 2023-08-20 VITALS
HEIGHT: 64 IN | HEART RATE: 84 BPM | OXYGEN SATURATION: 96 % | BODY MASS INDEX: 32.44 KG/M2 | TEMPERATURE: 98 F | DIASTOLIC BLOOD PRESSURE: 72 MMHG | WEIGHT: 190 LBS | RESPIRATION RATE: 20 BRPM | SYSTOLIC BLOOD PRESSURE: 134 MMHG

## 2023-08-20 DIAGNOSIS — M54.31 SCIATICA OF RIGHT SIDE: Primary | ICD-10-CM

## 2023-08-20 PROCEDURE — 96372 THER/PROPH/DIAG INJ SC/IM: CPT | Performed by: NURSE PRACTITIONER

## 2023-08-20 PROCEDURE — 25000003 PHARM REV CODE 250: Performed by: NURSE PRACTITIONER

## 2023-08-20 PROCEDURE — 63600175 PHARM REV CODE 636 W HCPCS: Performed by: NURSE PRACTITIONER

## 2023-08-20 PROCEDURE — 99284 EMERGENCY DEPT VISIT MOD MDM: CPT

## 2023-08-20 RX ORDER — HYDROCODONE BITARTRATE AND ACETAMINOPHEN 7.5; 325 MG/1; MG/1
1 TABLET ORAL
Status: COMPLETED | OUTPATIENT
Start: 2023-08-20 | End: 2023-08-20

## 2023-08-20 RX ORDER — DEXAMETHASONE SODIUM PHOSPHATE 10 MG/ML
8 INJECTION INTRAMUSCULAR; INTRAVENOUS
Status: COMPLETED | OUTPATIENT
Start: 2023-08-20 | End: 2023-08-20

## 2023-08-20 RX ORDER — CYCLOBENZAPRINE HCL 5 MG
10 TABLET ORAL
Status: COMPLETED | OUTPATIENT
Start: 2023-08-20 | End: 2023-08-20

## 2023-08-20 RX ADMIN — DEXAMETHASONE SODIUM PHOSPHATE 8 MG: 10 INJECTION INTRAMUSCULAR; INTRAVENOUS at 09:08

## 2023-08-20 RX ADMIN — HYDROCODONE BITARTRATE AND ACETAMINOPHEN 1 TABLET: 7.5; 325 TABLET ORAL at 09:08

## 2023-08-20 RX ADMIN — CYCLOBENZAPRINE HYDROCHLORIDE 10 MG: 5 TABLET, FILM COATED ORAL at 09:08

## 2023-08-21 NOTE — DISCHARGE INSTRUCTIONS
Rest, increase fluids, lots of water and liquids.  Ice treatment for 48-72 hours.  Then may try warm moist heat as needed.  Do not burn.  Call office for recheck.  Return as needed.  Continue prescribed medications.  Follow-up with your clinic on your orthopedic referral

## 2023-08-21 NOTE — ED PROVIDER NOTES
Encounter Date: 8/20/2023       History     Chief Complaint   Patient presents with    Back Pain     L buttock pain radiating down L leg that started today. Hx of sciatica. Denies injury.     POV to ED with family.  Patient complains of right lower back pain intermittent in nature for about 1 month.  Worse today.  She denies fall or injury.  History of sciatica.  Her clinic, pending an orthopedic referral.  No associated symptoms.  No other complaints    The history is provided by the patient.     Review of patient's allergies indicates:   Allergen Reactions    Sulfate ion Swelling    Codeine     Sulfa (sulfonamide antibiotics)      No past medical history on file.  Past Surgical History:   Procedure Laterality Date    tuballigation       Family History   Problem Relation Age of Onset    Breast cancer Maternal Aunt      Social History     Tobacco Use    Smoking status: Every Day     Current packs/day: 0.50     Average packs/day: 0.5 packs/day for 33.0 years (16.5 ttl pk-yrs)     Types: Cigarettes    Smokeless tobacco: Never    Tobacco comments:     10/6/22 Active participant with smoking cessation.   Substance Use Topics    Alcohol use: Not Currently    Drug use: Never     Review of Systems   Constitutional:  Negative for chills and fever.   HENT:  Negative for ear pain and sore throat.    Respiratory:  Negative for cough and shortness of breath.    Cardiovascular:  Negative for chest pain.   Gastrointestinal:  Negative for abdominal pain, diarrhea, nausea and vomiting.   Genitourinary:  Negative for dysuria.        No incontinence of urine or stool   Musculoskeletal:  Positive for back pain. Negative for neck pain.        Radiating pain, denies fall or injury   Neurological:  Negative for numbness.   All other systems reviewed and are negative.      Physical Exam     Initial Vitals   BP Pulse Resp Temp SpO2   08/20/23 2112 08/20/23 2109 08/20/23 2109 08/20/23 2109 08/20/23 2109   134/72 84 20 98.2 °F (36.8 °C) 96 %       MAP       --                Physical Exam    Nursing note and vitals reviewed.  Constitutional: She appears well-developed and well-nourished. No distress.   HENT:   Head: Normocephalic and atraumatic.   Mouth/Throat: Oropharynx is clear and moist.   Eyes: Pupils are equal, round, and reactive to light.   Neck:   Normal range of motion.  Cardiovascular:  Normal rate and regular rhythm.           Pulmonary/Chest: Breath sounds normal.   Abdominal: Abdomen is soft. Bowel sounds are normal. There is no abdominal tenderness.   Musculoskeletal:         General: Normal range of motion.      Cervical back: Normal range of motion.      Comments: Tenderness right lower back area.  No rash or redness or swelling or warmth     Neurological: She is alert and oriented to person, place, and time. She has normal strength. GCS score is 15. GCS eye subscore is 4. GCS verbal subscore is 5. GCS motor subscore is 6.   Skin: Skin is warm and dry. Capillary refill takes 2 to 3 seconds.   Psychiatric: She has a normal mood and affect. Thought content normal.         ED Course   Procedures  Labs Reviewed - No data to display       Imaging Results    None          Medications   HYDROcodone-acetaminophen 7.5-325 mg per tablet 1 tablet (has no administration in time range)   dexAMETHasone sodium phos (PF) injection 8 mg (has no administration in time range)   cyclobenzaprine tablet 10 mg (has no administration in time range)     Medical Decision Making  Presents for evaluation of or back pain.  Disposition pending denies fall or injury  Differentials: , sprain, strain, sciatica, muscle spasm  Plan of care:  Medicated in the ED. Patient is to follow up with her clinic, to continue prescribed medications, follow-up on her orthopedic referral.  She agrees with plan of care    Risk  Prescription drug management.                               Clinical Impression:   Final diagnoses:  [M54.31] Sciatica of right side (Primary)        ED  Disposition Condition    Discharge Stable          ED Prescriptions    None       Follow-up Information       Follow up With Specialties Details Why Contact Info    Yoseph Martinez MD Family Medicine Call in 3 days For office recheck [address] [facility] [place] [address] [phone]               Amanda He NP  08/20/23 0766

## 2023-08-23 ENCOUNTER — LAB VISIT (OUTPATIENT)
Dept: LAB | Facility: HOSPITAL | Age: 48
End: 2023-08-23
Attending: FAMILY MEDICINE
Payer: COMMERCIAL

## 2023-08-23 ENCOUNTER — TELEPHONE (OUTPATIENT)
Dept: SPINE | Facility: CLINIC | Age: 48
End: 2023-08-23

## 2023-08-23 DIAGNOSIS — R60.0 LOCALIZED EDEMA: ICD-10-CM

## 2023-08-23 DIAGNOSIS — Z00.00 ANNUAL PHYSICAL EXAM: ICD-10-CM

## 2023-08-23 LAB
ALBUMIN SERPL BCP-MCNC: 3.6 G/DL (ref 3.5–5.2)
ALP SERPL-CCNC: 49 U/L (ref 55–135)
ALT SERPL W/O P-5'-P-CCNC: 18 U/L (ref 10–44)
ANION GAP SERPL CALC-SCNC: 9 MMOL/L (ref 8–16)
AST SERPL-CCNC: 17 U/L (ref 10–40)
BASOPHILS # BLD AUTO: 0.03 K/UL (ref 0–0.2)
BASOPHILS NFR BLD: 0.4 % (ref 0–1.9)
BILIRUB SERPL-MCNC: 0.2 MG/DL (ref 0.1–1)
BNP SERPL-MCNC: 12 PG/ML (ref 0–99)
BUN SERPL-MCNC: 13 MG/DL (ref 6–20)
CALCIUM SERPL-MCNC: 9.5 MG/DL (ref 8.7–10.5)
CHLORIDE SERPL-SCNC: 99 MMOL/L (ref 95–110)
CHOLEST SERPL-MCNC: 267 MG/DL (ref 120–199)
CHOLEST/HDLC SERPL: 5.6 {RATIO} (ref 2–5)
CO2 SERPL-SCNC: 31 MMOL/L (ref 23–29)
CREAT SERPL-MCNC: 0.7 MG/DL (ref 0.5–1.4)
DIFFERENTIAL METHOD: ABNORMAL
EOSINOPHIL # BLD AUTO: 0 K/UL (ref 0–0.5)
EOSINOPHIL NFR BLD: 0.1 % (ref 0–8)
ERYTHROCYTE [DISTWIDTH] IN BLOOD BY AUTOMATED COUNT: 13.3 % (ref 11.5–14.5)
EST. GFR  (NO RACE VARIABLE): >60 ML/MIN/1.73 M^2
ESTIMATED AVG GLUCOSE: 114 MG/DL (ref 68–131)
GLUCOSE SERPL-MCNC: 90 MG/DL (ref 70–110)
HBA1C MFR BLD: 5.6 % (ref 4–5.6)
HCT VFR BLD AUTO: 35.9 % (ref 37–48.5)
HDLC SERPL-MCNC: 48 MG/DL (ref 40–75)
HDLC SERPL: 18 % (ref 20–50)
HGB BLD-MCNC: 12.2 G/DL (ref 12–16)
IMM GRANULOCYTES # BLD AUTO: 0.02 K/UL (ref 0–0.04)
IMM GRANULOCYTES NFR BLD AUTO: 0.3 % (ref 0–0.5)
LDLC SERPL CALC-MCNC: 164 MG/DL (ref 63–159)
LYMPHOCYTES # BLD AUTO: 3.7 K/UL (ref 1–4.8)
LYMPHOCYTES NFR BLD: 46.5 % (ref 18–48)
MCH RBC QN AUTO: 30.7 PG (ref 27–31)
MCHC RBC AUTO-ENTMCNC: 34 G/DL (ref 32–36)
MCV RBC AUTO: 90 FL (ref 82–98)
MONOCYTES # BLD AUTO: 0.5 K/UL (ref 0.3–1)
MONOCYTES NFR BLD: 5.7 % (ref 4–15)
NEUTROPHILS # BLD AUTO: 3.7 K/UL (ref 1.8–7.7)
NEUTROPHILS NFR BLD: 47 % (ref 38–73)
NONHDLC SERPL-MCNC: 219 MG/DL
NRBC BLD-RTO: 0 /100 WBC
PLATELET # BLD AUTO: 286 K/UL (ref 150–450)
PMV BLD AUTO: 9.4 FL (ref 9.2–12.9)
POTASSIUM SERPL-SCNC: 4.1 MMOL/L (ref 3.5–5.1)
PROT SERPL-MCNC: 6.4 G/DL (ref 6–8.4)
RBC # BLD AUTO: 3.98 M/UL (ref 4–5.4)
SODIUM SERPL-SCNC: 139 MMOL/L (ref 136–145)
TRIGL SERPL-MCNC: 275 MG/DL (ref 30–150)
TSH SERPL DL<=0.005 MIU/L-ACNC: 2.52 UIU/ML (ref 0.4–4)
WBC # BLD AUTO: 7.87 K/UL (ref 3.9–12.7)

## 2023-08-23 PROCEDURE — 84443 ASSAY THYROID STIM HORMONE: CPT | Performed by: FAMILY MEDICINE

## 2023-08-23 PROCEDURE — 83880 ASSAY OF NATRIURETIC PEPTIDE: CPT | Performed by: FAMILY MEDICINE

## 2023-08-23 PROCEDURE — 83036 HEMOGLOBIN GLYCOSYLATED A1C: CPT | Performed by: FAMILY MEDICINE

## 2023-08-23 PROCEDURE — 85025 COMPLETE CBC W/AUTO DIFF WBC: CPT | Performed by: FAMILY MEDICINE

## 2023-08-23 PROCEDURE — 36415 COLL VENOUS BLD VENIPUNCTURE: CPT | Performed by: FAMILY MEDICINE

## 2023-08-23 PROCEDURE — 80053 COMPREHEN METABOLIC PANEL: CPT | Performed by: FAMILY MEDICINE

## 2023-08-23 PROCEDURE — 80061 LIPID PANEL: CPT | Performed by: FAMILY MEDICINE

## 2023-08-23 NOTE — TELEPHONE ENCOUNTER
----- Message from Gabriel Rojas sent at 8/23/2023 11:35 AM CDT -----  Type:  Patient Returning Call    Who Called:  pt  Who Left Message for Patient:  Arabella  Does the patient know what this is regarding?:  yes  Best Call Back Number:  [phone]  Additional Information:  pt has a referral in the system for Back [facility] Sp[facility], pl call bk to advise thanks

## 2023-08-24 ENCOUNTER — TELEPHONE (OUTPATIENT)
Dept: SPINE | Facility: CLINIC | Age: 48
End: 2023-08-24

## 2023-08-24 NOTE — TELEPHONE ENCOUNTER
----- Message from Nuris Leary sent at 8/24/2023  2:08 PM CDT -----  Contact: self  Type:  Patient Returning Call    Who Called:  patient   Who Left Message for Patient:  Priti   Does the patient know what this is regarding?:  [facility] app[facility]  Best Call Back Number:  [phone]  Additional Information:  thanks

## 2023-08-25 DIAGNOSIS — I10 HYPERTENSION, UNSPECIFIED TYPE: ICD-10-CM

## 2023-08-25 NOTE — TELEPHONE ENCOUNTER
----- Message from Yulia Juanito sent at 8/25/2023  1:47 PM CDT -----  Contact: Self  Type:  RX Refill Request    Who Called:  Pt  Refill or New Rx:  Refill  RX Name and Strength:  lisinopriL-hydrochlorothiazide (PRINZIDE,ZESTORETIC) 10-12.5 mg per tablet   How is the patient currently taking it? (ex. 1XDay):  90 tablet 3  Is this a 30 day or 90 day RX:  Take 1 tablet by mouth once daily. - Oral  Preferred Pharmacy with phone number:    [facility] [address] [address]  Phone: [phone] Fax: [phone]  Local or Mail Order:  Local  Ordering Provider:  Michelle Canchola Call Back Number:  [phone]  Additional Information:  Please call back to advise. Thanks!

## 2023-08-26 DIAGNOSIS — E78.5 HYPERLIPIDEMIA, UNSPECIFIED HYPERLIPIDEMIA TYPE: Primary | ICD-10-CM

## 2023-08-26 RX ORDER — ATORVASTATIN CALCIUM 40 MG/1
40 TABLET, FILM COATED ORAL DAILY
Qty: 90 TABLET | Refills: 3 | Status: SHIPPED | OUTPATIENT
Start: 2023-08-26 | End: 2024-08-25

## 2023-08-26 RX ORDER — LISINOPRIL AND HYDROCHLOROTHIAZIDE 10; 12.5 MG/1; MG/1
1 TABLET ORAL DAILY
Qty: 90 TABLET | Refills: 3 | Status: SHIPPED | OUTPATIENT
Start: 2023-08-26 | End: 2024-08-25

## 2023-09-01 ENCOUNTER — TELEPHONE (OUTPATIENT)
Dept: SPINE | Facility: CLINIC | Age: 48
End: 2023-09-01

## 2023-09-14 ENCOUNTER — TELEPHONE (OUTPATIENT)
Dept: SMOKING CESSATION | Facility: CLINIC | Age: 48
End: 2023-09-14

## 2023-11-10 ENCOUNTER — HOSPITAL ENCOUNTER (EMERGENCY)
Facility: HOSPITAL | Age: 48
Discharge: HOME | End: 2023-11-10
Attending: EMERGENCY MEDICINE

## 2023-11-10 VITALS
BODY MASS INDEX: 32.44 KG/M2 | WEIGHT: 190 LBS | OXYGEN SATURATION: 97 % | RESPIRATION RATE: 15 BRPM | DIASTOLIC BLOOD PRESSURE: 69 MMHG | SYSTOLIC BLOOD PRESSURE: 115 MMHG | HEART RATE: 91 BPM | HEIGHT: 64 IN | TEMPERATURE: 98 F

## 2023-11-10 DIAGNOSIS — M54.31 SCIATICA OF RIGHT SIDE: Primary | ICD-10-CM

## 2023-11-10 PROCEDURE — 63600175 PHARM REV CODE 636 W HCPCS: Performed by: NURSE PRACTITIONER

## 2023-11-10 PROCEDURE — 96372 THER/PROPH/DIAG INJ SC/IM: CPT | Performed by: NURSE PRACTITIONER

## 2023-11-10 PROCEDURE — 99284 EMERGENCY DEPT VISIT MOD MDM: CPT

## 2023-11-10 PROCEDURE — 25000003 PHARM REV CODE 250: Performed by: NURSE PRACTITIONER

## 2023-11-10 RX ORDER — CYCLOBENZAPRINE HCL 5 MG
10 TABLET ORAL
Status: COMPLETED | OUTPATIENT
Start: 2023-11-10 | End: 2023-11-10

## 2023-11-10 RX ORDER — METHYLPREDNISOLONE SOD SUCC 125 MG
125 VIAL (EA) INJECTION
Status: COMPLETED | OUTPATIENT
Start: 2023-11-10 | End: 2023-11-10

## 2023-11-10 RX ORDER — HYDROCODONE BITARTRATE AND ACETAMINOPHEN 7.5; 325 MG/1; MG/1
1 TABLET ORAL
Status: COMPLETED | OUTPATIENT
Start: 2023-11-10 | End: 2023-11-10

## 2023-11-10 RX ORDER — CYCLOBENZAPRINE HCL 10 MG
10 TABLET ORAL 3 TIMES DAILY PRN
Qty: 30 TABLET | Refills: 2 | Status: SHIPPED | OUTPATIENT
Start: 2023-11-10

## 2023-11-10 RX ORDER — DICLOFENAC SODIUM 75 MG/1
75 TABLET, DELAYED RELEASE ORAL 2 TIMES DAILY
Qty: 30 TABLET | Refills: 2 | Status: SHIPPED | OUTPATIENT
Start: 2023-11-10

## 2023-11-10 RX ADMIN — METHYLPREDNISOLONE SODIUM SUCCINATE 125 MG: 125 INJECTION, POWDER, FOR SOLUTION INTRAMUSCULAR; INTRAVENOUS at 03:11

## 2023-11-10 RX ADMIN — HYDROCODONE BITARTRATE AND ACETAMINOPHEN 1 TABLET: 7.5; 325 TABLET ORAL at 03:11

## 2023-11-10 RX ADMIN — CYCLOBENZAPRINE HYDROCHLORIDE 10 MG: 5 TABLET, FILM COATED ORAL at 03:11

## 2023-11-10 NOTE — DISCHARGE INSTRUCTIONS
Rest, increase fluids, lots of water and liquids. Warm compresses, do not burn. Call office for recheck. Return as needed

## 2023-11-11 NOTE — ED PROVIDER NOTES
Encounter Date: 11/10/2023       History     Chief Complaint   Patient presents with    Sciatica     Pain radiating down right buttock down into right leg and foot x 4 days.     POV the ED with friend.  Patient complains of right lower back pain that radiates into her right leg onset yesterday.  She denies fall or injury.  History of sciatica in the past.  No other complaints    The history is provided by the patient.     Review of patient's allergies indicates:   Allergen Reactions    Sulfate ion Swelling    Codeine     Opioids - morphine analogues Hives    Sulfa (sulfonamide antibiotics)      History reviewed. No pertinent past medical history.  Past Surgical History:   Procedure Laterality Date    tuballigation       Family History   Problem Relation Age of Onset    Breast cancer Maternal Aunt      Social History     Tobacco Use    Smoking status: Every Day     Current packs/day: 0.50     Average packs/day: 0.5 packs/day for 33.0 years (16.5 ttl pk-yrs)     Types: Cigarettes    Smokeless tobacco: Never    Tobacco comments:     10/6/22 Active participant with smoking cessation.   Substance Use Topics    Alcohol use: Not Currently    Drug use: Never     Review of Systems   Constitutional:  Negative for chills and fever.   HENT:  Negative for ear pain and sore throat.    Respiratory:  Negative for cough and shortness of breath.    Cardiovascular:  Negative for chest pain.   Gastrointestinal:  Negative for abdominal pain, diarrhea, nausea and vomiting.        Denies urinary or stool incontinence   Genitourinary:  Negative for dysuria.   Musculoskeletal:  Positive for back pain.   Neurological:  Negative for numbness.   All other systems reviewed and are negative.      Physical Exam     Initial Vitals [11/10/23 1350]   BP Pulse Resp Temp SpO2   115/69 91 15 98.4 °F (36.9 °C) 97 %      MAP       --         Physical Exam    Nursing note and vitals reviewed.  Constitutional: She appears well-developed and well-nourished.  No distress.   HENT:   Head: Normocephalic and atraumatic.   Eyes: Pupils are equal, round, and reactive to light.   Neck:   Normal range of motion.  Cardiovascular:  Normal rate and regular rhythm.           Pulmonary/Chest: Breath sounds normal. No respiratory distress.   Abdominal: Abdomen is soft. There is no abdominal tenderness.   Musculoskeletal:         General: Normal range of motion.      Cervical back: Normal range of motion.      Comments: Tenderness right SI area.  No rash     Neurological: She is alert and oriented to person, place, and time. She has normal strength. GCS score is 15. GCS eye subscore is 4. GCS verbal subscore is 5. GCS motor subscore is 6.   Skin: Skin is warm and dry. Capillary refill takes 2 to 3 seconds.   Psychiatric: She has a normal mood and affect. Thought content normal.         ED Course   Procedures  Labs Reviewed - No data to display       Imaging Results    None          Medications   HYDROcodone-acetaminophen 7.5-325 mg per tablet 1 tablet (1 tablet Oral Given 11/10/23 1514)   cyclobenzaprine tablet 10 mg (10 mg Oral Given 11/10/23 1514)   methylPREDNISolone sodium succinate injection 125 mg (125 mg Intramuscular Given 11/10/23 1514)     Medical Decision Making  Presents for evaluation of back pain. Denies fall or injury. Disposition pending  Differentials including but not limited to chronic back pain, sciatica, sprain, strain  Plan of care, patient will be discharged home.  Diagnosis sciatica.  Prescriptions for home use.  Medicated pain in the ED.  To follow up with clinic.  Work note given.  To return as needed.  She agrees with plan of care    Risk  OTC drugs.  Prescription drug management.                               Clinical Impression:   Final diagnoses:  [M54.31] Sciatica of right side (Primary)        ED Disposition Condition    Discharge Stable          ED Prescriptions       Medication Sig Dispense Start Date End Date Auth. Provider    diclofenac (VOLTAREN)  75 MG EC tablet Take 1 tablet (75 mg total) by mouth 2 (two) times daily. 30 tablet 11/10/2023 -- mAanda He NP    cyclobenzaprine (FLEXERIL) 10 MG tablet Take 1 tablet (10 mg total) by mouth 3 (three) times daily as needed for Muscle spasms. 30 tablet 11/10/2023 -- Amanda He NP          Follow-up Information       Follow up With Specialties Details Why Contact Info    Yoseph Martinez MD Family Medicine Call in 3 days  [address] [facility] [place] [address] [phone]               Amanda He NP  11/10/23 2027

## 2024-01-12 ENCOUNTER — HOSPITAL ENCOUNTER (EMERGENCY)
Facility: HOSPITAL | Age: 49
Discharge: HOME | End: 2024-01-12
Attending: EMERGENCY MEDICINE

## 2024-01-12 VITALS
WEIGHT: 195 LBS | RESPIRATION RATE: 20 BRPM | TEMPERATURE: 99 F | OXYGEN SATURATION: 98 % | HEIGHT: 65 IN | DIASTOLIC BLOOD PRESSURE: 71 MMHG | SYSTOLIC BLOOD PRESSURE: 143 MMHG | BODY MASS INDEX: 32.49 KG/M2 | HEART RATE: 79 BPM

## 2024-01-12 DIAGNOSIS — N39.0 URINARY TRACT INFECTION WITH HEMATURIA, SITE UNSPECIFIED: ICD-10-CM

## 2024-01-12 DIAGNOSIS — R31.9 URINARY TRACT INFECTION WITH HEMATURIA, SITE UNSPECIFIED: ICD-10-CM

## 2024-01-12 DIAGNOSIS — J06.9 VIRAL UPPER RESPIRATORY INFECTION: Primary | ICD-10-CM

## 2024-01-12 DIAGNOSIS — A59.9 TRICHOMONAS INFECTION: ICD-10-CM

## 2024-01-12 LAB
BACTERIA #/AREA URNS HPF: ABNORMAL /HPF
BILIRUB UR QL STRIP: NEGATIVE
CLARITY UR: ABNORMAL
COLOR UR: YELLOW
GLUCOSE UR QL STRIP: NEGATIVE
HGB UR QL STRIP: ABNORMAL
INFLUENZA A, MOLECULAR: NEGATIVE
INFLUENZA B, MOLECULAR: NEGATIVE
KETONES UR QL STRIP: NEGATIVE
LEUKOCYTE ESTERASE UR QL STRIP: ABNORMAL
MICROSCOPIC COMMENT: ABNORMAL
NITRITE UR QL STRIP: NEGATIVE
PH UR STRIP: 7 [PH] (ref 5–8)
PROT UR QL STRIP: NEGATIVE
RBC #/AREA URNS HPF: 14 /HPF (ref 0–4)
SARS-COV-2 RDRP RESP QL NAA+PROBE: NEGATIVE
SP GR UR STRIP: 1.02 (ref 1–1.03)
SPECIMEN SOURCE: NORMAL
SQUAMOUS #/AREA URNS HPF: 9 /HPF
TRICHOMONAS UR QL MICRO: ABNORMAL
URN SPEC COLLECT METH UR: ABNORMAL
UROBILINOGEN UR STRIP-ACNC: NEGATIVE EU/DL
WBC #/AREA URNS HPF: 70 /HPF (ref 0–5)

## 2024-01-12 PROCEDURE — 81000 URINALYSIS NONAUTO W/SCOPE: CPT | Performed by: EMERGENCY MEDICINE

## 2024-01-12 PROCEDURE — U0002 COVID-19 LAB TEST NON-CDC: HCPCS | Performed by: EMERGENCY MEDICINE

## 2024-01-12 PROCEDURE — 87502 INFLUENZA DNA AMP PROBE: CPT | Performed by: EMERGENCY MEDICINE

## 2024-01-12 PROCEDURE — 87086 URINE CULTURE/COLONY COUNT: CPT | Performed by: EMERGENCY MEDICINE

## 2024-01-12 PROCEDURE — 99284 EMERGENCY DEPT VISIT MOD MDM: CPT

## 2024-01-12 PROCEDURE — 25000003 PHARM REV CODE 250: Performed by: EMERGENCY MEDICINE

## 2024-01-12 PROCEDURE — 63600175 PHARM REV CODE 636 W HCPCS: Performed by: EMERGENCY MEDICINE

## 2024-01-12 RX ORDER — CLONAZEPAM 0.5 MG/1
TABLET ORAL
COMMUNITY
Start: 2024-01-02

## 2024-01-12 RX ORDER — GUAIFENESIN AND DEXTROMETHORPHAN HYDROBROMIDE 1200; 60 MG/1; MG/1
1 TABLET, EXTENDED RELEASE ORAL EVERY 12 HOURS PRN
Qty: 10 TABLET | Refills: 0 | Status: SHIPPED | OUTPATIENT
Start: 2024-01-12 | End: 2024-01-22

## 2024-01-12 RX ORDER — METRONIDAZOLE 250 MG/1
2000 TABLET ORAL
Status: COMPLETED | OUTPATIENT
Start: 2024-01-12 | End: 2024-01-12

## 2024-01-12 RX ORDER — PSEUDOEPHEDRINE HYDROCHLORIDE 60 MG/1
60 TABLET ORAL EVERY 6 HOURS PRN
Qty: 12 TABLET | Refills: 0 | Status: SHIPPED | OUTPATIENT
Start: 2024-01-12 | End: 2024-01-22

## 2024-01-12 RX ORDER — ONDANSETRON 4 MG/1
4 TABLET, FILM COATED ORAL EVERY 6 HOURS
Qty: 12 TABLET | Refills: 0 | Status: SHIPPED | OUTPATIENT
Start: 2024-01-12

## 2024-01-12 RX ORDER — MELOXICAM 15 MG/1
15 TABLET ORAL
COMMUNITY
Start: 2024-01-02 | End: 2024-02-20

## 2024-01-12 RX ORDER — CEPHALEXIN 500 MG/1
500 CAPSULE ORAL EVERY 12 HOURS
Qty: 20 CAPSULE | Refills: 0 | Status: SHIPPED | OUTPATIENT
Start: 2024-01-12 | End: 2024-01-22

## 2024-01-12 RX ORDER — PREDNISONE 20 MG/1
40 TABLET ORAL DAILY
Qty: 10 TABLET | Refills: 0 | Status: SHIPPED | OUTPATIENT
Start: 2024-01-12 | End: 2024-01-17

## 2024-01-12 RX ORDER — CEPHALEXIN 250 MG/1
500 CAPSULE ORAL
Status: COMPLETED | OUTPATIENT
Start: 2024-01-12 | End: 2024-01-12

## 2024-01-12 RX ORDER — PREDNISONE 10 MG/1
60 TABLET ORAL
Status: COMPLETED | OUTPATIENT
Start: 2024-01-12 | End: 2024-01-12

## 2024-01-12 RX ADMIN — CEPHALEXIN 500 MG: 250 CAPSULE ORAL at 03:01

## 2024-01-12 RX ADMIN — PREDNISONE 60 MG: 10 TABLET ORAL at 03:01

## 2024-01-12 RX ADMIN — METRONIDAZOLE 2000 MG: 250 TABLET, FILM COATED ORAL at 03:01

## 2024-01-12 NOTE — ED PROVIDER NOTES
Encounter Date: 1/12/2024       History     Chief Complaint   Patient presents with    flu like symptoms     Patient with c/o flu like symptoms that have been ongoing over the past two days; Relatives in home have flu     48-year-old female here with her  both of whom have flu-like symptoms.  States that someone in the home tested positive for influenza B and they are concerned they may also have it.  Patient is complaining of mild lower abdominal pain bilateral flank pain chills cough and runny nose.      The history is provided by the patient. No  was used.     Review of patient's allergies indicates:   Allergen Reactions    Sulfate ion Swelling    Codeine     Opioids - morphine analogues Hives    Sulfa (sulfonamide antibiotics)      Past Medical History:   Diagnosis Date    Essential (primary) hypertension     Mixed hyperlipidemia      Past Surgical History:   Procedure Laterality Date    tuballigation       Family History   Problem Relation Age of Onset    Breast cancer Maternal Aunt      Social History     Tobacco Use    Smoking status: Every Day     Current packs/day: 0.50     Average packs/day: 0.5 packs/day for 33.0 years (16.5 ttl pk-yrs)     Types: Cigarettes    Smokeless tobacco: Never    Tobacco comments:     10/6/22 Active participant with smoking cessation.   Substance Use Topics    Alcohol use: Not Currently    Drug use: Never     Review of Systems   Constitutional:  Positive for chills and fatigue. Negative for fever.   HENT:  Positive for congestion and rhinorrhea. Negative for sore throat.    Respiratory:  Positive for cough. Negative for shortness of breath.    Cardiovascular:  Negative for chest pain.   Gastrointestinal:  Negative for nausea.   Genitourinary:  Positive for flank pain. Negative for dysuria.   Musculoskeletal:  Negative for back pain.   Skin:  Negative for rash.   Neurological:  Negative for weakness.   Hematological:  Does not bruise/bleed easily.   All  other systems reviewed and are negative.      Physical Exam     Initial Vitals [01/12/24 0203]   BP Pulse Resp Temp SpO2   (!) 143/71 79 20 98.5 °F (36.9 °C) 98 %      MAP       --         Physical Exam    Nursing note and vitals reviewed.  Constitutional: She appears well-developed and well-nourished.   HENT:   Head: Normocephalic and atraumatic.   Eyes: Pupils are equal, round, and reactive to light.   Neck:   Normal range of motion.  Cardiovascular:  Normal rate and regular rhythm.           Pulmonary/Chest: Breath sounds normal. No respiratory distress.   Abdominal: Abdomen is soft. She exhibits no distension. There is no abdominal tenderness. There is no rebound.   Musculoskeletal:         General: Normal range of motion.      Cervical back: Normal range of motion.     Neurological: She is alert and oriented to person, place, and time. GCS score is 15. GCS eye subscore is 4. GCS verbal subscore is 5. GCS motor subscore is 6.         ED Course   Procedures  Labs Reviewed   URINALYSIS, REFLEX TO URINE CULTURE - Abnormal; Notable for the following components:       Result Value    Appearance, UA Hazy (*)     Occult Blood UA 1+ (*)     Leukocytes, UA 3+ (*)     All other components within normal limits    Narrative:     Preferred Collection Type->Urine, Clean Catch  Specimen Source->Urine   URINALYSIS MICROSCOPIC - Abnormal; Notable for the following components:    RBC, UA 14 (*)     WBC, UA 70 (*)     Bacteria Few (*)     Trichomonas, UA Moderate (*)     All other components within normal limits    Narrative:     Preferred Collection Type->Urine, Clean Catch  Specimen Source->Urine   INFLUENZA A & B BY MOLECULAR   CULTURE, URINE   SARS-COV-2 RNA AMPLIFICATION, QUAL    Narrative:     Is the patient symptomatic?->Yes          Imaging Results    None          Medications   predniSONE tablet 60 mg (60 mg Oral Given 1/12/24 0017)   metroNIDAZOLE tablet 2,000 mg (2,000 mg Oral Given 1/12/24 9062)   cephALEXin capsule  500 mg (500 mg Oral Given 1/12/24 0358)     Medical Decision Making  Differential diagnosis for this patient includes COVID-19, influenza, other upper respiratory viral illness, bronchitis, pneumonia, sepsis, UTI, kidney stone    Swabs for influenza and COVID negative however this may be false negative given that is less than or approximately 24 hours since onset of symptoms.  Given patient's flank pain and mild lower abdominal pain I did obtain a urinalysis.  Which showed evidence of urinary tract infection as well as Trichomonas.  Patient was informed of this she was given antibiotics for the Trichomonas in the urinary tract infection as well as prescriptions for symptomatic management of her likely upper respiratory viral illness.    Amount and/or Complexity of Data Reviewed  Labs:  Decision-making details documented in ED Course.    Risk  OTC drugs.  Prescription drug management.                                      Clinical Impression:  Final diagnoses:  [J06.9] Viral upper respiratory infection (Primary)  [A59.9] Trichomonas infection  [N39.0, R31.9] Urinary tract infection with hematuria, site unspecified          ED Disposition Condition    Discharge Stable          ED Prescriptions       Medication Sig Dispense Start Date End Date Auth. Provider    pseudoephedrine (SUDAFED) 60 MG tablet Take 1 tablet (60 mg total) by mouth every 6 (six) hours as needed for Congestion. 12 tablet 1/12/2024 1/22/2024 Molly Orr MD    dextromethorphan-guaiFENesin (MUCINEX DM) 60-1,200 mg per 12 hr tablet Take 1 tablet by mouth every 12 (twelve) hours as needed (congestion). 10 tablet 1/12/2024 1/22/2024 Molly Orr MD    ondansetron (ZOFRAN) 4 MG tablet Take 1 tablet (4 mg total) by mouth every 6 (six) hours. 12 tablet 1/12/2024 -- Molly Orr MD    predniSONE (DELTASONE) 20 MG tablet Take 2 tablets (40 mg total) by mouth once daily. for 5 days 10 tablet 1/12/2024 1/17/2024 Molly Orr MD    cephALEXin  (KEFLEX) 500 MG capsule Take 1 capsule (500 mg total) by mouth every 12 (twelve) hours. for 10 days 20 capsule 1/12/2024 1/22/2024 Molly Orr MD          Follow-up Information       Follow up With Specialties Details Why Contact Info    Yoseph Martinez MD Family Medicine   [address] [place] [address] [place] [address]  [phone]               Molly Orr MD  01/12/24 0512

## 2024-01-13 LAB
BACTERIA UR CULT: NORMAL
BACTERIA UR CULT: NORMAL

## 2024-02-20 ENCOUNTER — HOSPITAL ENCOUNTER (EMERGENCY)
Facility: HOSPITAL | Age: 49
Discharge: HOME | End: 2024-02-20

## 2024-02-20 VITALS
SYSTOLIC BLOOD PRESSURE: 108 MMHG | DIASTOLIC BLOOD PRESSURE: 61 MMHG | HEART RATE: 89 BPM | TEMPERATURE: 98 F | BODY MASS INDEX: 32.51 KG/M2 | WEIGHT: 195.13 LBS | OXYGEN SATURATION: 99 % | RESPIRATION RATE: 18 BRPM | HEIGHT: 65 IN

## 2024-02-20 DIAGNOSIS — M54.32 SCIATICA OF LEFT SIDE: Primary | ICD-10-CM

## 2024-02-20 PROCEDURE — 99284 EMERGENCY DEPT VISIT MOD MDM: CPT | Mod: 25

## 2024-02-20 PROCEDURE — 96372 THER/PROPH/DIAG INJ SC/IM: CPT | Performed by: PHYSICIAN ASSISTANT

## 2024-02-20 PROCEDURE — 25000003 PHARM REV CODE 250: Performed by: PHYSICIAN ASSISTANT

## 2024-02-20 PROCEDURE — 63600175 PHARM REV CODE 636 W HCPCS: Performed by: PHYSICIAN ASSISTANT

## 2024-02-20 RX ORDER — MELOXICAM 15 MG/1
15 TABLET ORAL DAILY
Qty: 5 TABLET | Refills: 0 | Status: SHIPPED | OUTPATIENT
Start: 2024-02-20 | End: 2024-02-25

## 2024-02-20 RX ORDER — DEXAMETHASONE SODIUM PHOSPHATE 10 MG/ML
10 INJECTION INTRAMUSCULAR; INTRAVENOUS
Status: COMPLETED | OUTPATIENT
Start: 2024-02-20 | End: 2024-02-20

## 2024-02-20 RX ORDER — HYDROCODONE BITARTRATE AND ACETAMINOPHEN 10; 325 MG/1; MG/1
1 TABLET ORAL
Status: COMPLETED | OUTPATIENT
Start: 2024-02-20 | End: 2024-02-20

## 2024-02-20 RX ADMIN — HYDROCODONE BITARTRATE AND ACETAMINOPHEN 1 TABLET: 10; 325 TABLET ORAL at 02:02

## 2024-02-20 RX ADMIN — DEXAMETHASONE SODIUM PHOSPHATE 10 MG: 10 INJECTION, SOLUTION INTRAMUSCULAR; INTRAVENOUS at 02:02

## 2024-02-20 NOTE — ED PROVIDER NOTES
Please note that my documentation in this Electronic Healthcare Record was produced using speech recognition software and therefore may contain errors related to that software.These could include grammar, punctuation and spelling errors or the inclusion/ exclusion of phrases that were not intended. Please contact myself for any clarification, questions or concerns.    HPI: Patient is a 49 y.o. female who presents with the chief complaint of left-sided sciatic pain X today.  History of sciatica.  Denies any falls or trauma, numbness or tingling, urinary symptoms, fever, saddle anesthesia.  She has been seen here before for right-sided sciatica in the past.  Has an appointment with neurosurgery to/29/24.  She does have Flexeril at home.  Takes buprenorphine.    REVIEW OF SYSTEMS - 10 systems were independently reviewed and are otherwise negative with the exception of those items previously documented in the HPI and nursing notes.    Allergy: Sulfate ion, Codeine, Opioids - morphine analogues, and Sulfa (sulfonamide antibiotics)    Past medical history:   Past Medical History:   Diagnosis Date    Essential (primary) hypertension     Mixed hyperlipidemia        Surgical History:   Past Surgical History:   Procedure Laterality Date    tuballigation         Social history:   Social History     Socioeconomic History    Marital status: Single   Tobacco Use    Smoking status: Every Day     Current packs/day: 0.50     Average packs/day: 0.5 packs/day for 33.0 years (16.5 ttl pk-yrs)     Types: Cigarettes    Smokeless tobacco: Never    Tobacco comments:     10/6/22 Active participant with smoking cessation.   Substance and Sexual Activity    Alcohol use: Not Currently    Drug use: Never    Sexual activity: Not Currently     Partners: Male       Family history: non-contributory    EHR: reviewed    Vitals: /61 (BP Location: Left arm, Patient Position: Sitting)   Pulse 89   Temp 98.3 °F (36.8 °C) (Oral)   Resp 18   Ht  "5' 5" (1.651 m)   Wt 88.5 kg (195 lb 1.7 oz)   SpO2 99%   Breastfeeding No   BMI 32.47 kg/m²     PHYSICAL EXAM:    General-49-year-old female awake and alert, oriented, GCS 15, in no acute distress,  HEENT- normocephalic, atraumatic, sclera anicteric  CARDIOVASCULAR- regular rate and rhythm  PULMONARY- nonlabored, no respiratory distress  NEUROLOGIC- mental status normal, speech fluid, cognition normal, CN II-XII grossly intact, ambulatory with proper gait.  MUSCULOSKELETAL- well-nourished, well-developed, left gluteal tenderness into the posterior thigh.  Full range of motion of the extremity.  Peripheral pulse 2 +/4, sensation is equal and normal.  No midline tenderness of the spine.  DERMATOLOGIC- warm and dry, no visible rashes  PSYCHIATRIC- normal affect, normal concentration      ED Course as of 02/20/24 1559   Tue Feb 20, 2024   1408 Left sciatic pain x last night. Denies falls/trauma. Hx of sciatica. Appt with neurosurgery 2/29 [SR]      ED Course User Index  [SR] La Nena Clemente PA       Labs Reviewed - No data to display    No orders to display       MEDICAL DECISION MAKING: Patient is a 49 y.o. female who presented with chief complaint of left-sided sciatic pain with history of prior.  Denies falls or trauma, saddle anesthesia, urinary or bowel changes, fever, abdominal pain.  On examination, she does have tenderness over the sciatic nerve.  Good peripheral pulses, sensation, range of motion.  Differentials considered, cauda equina, epidural abscess, AAA, sciatica, fracture dislocation, etc..  Patient was given a dose of dexamethasone and Norco.  She has muscle relaxants at home.  We will refill her meloxicam.  She will follow up with Neurosurgery.  Patient's vitals are stable and normal.  They will be discharged home in stable condition.  They verbalized their understanding and agreed to this plan.    CLINICAL IMPRESSION:  1. Sciatica of left side         La Nena Clemente PA  02/20/24 1602    "

## 2024-07-27 ENCOUNTER — OFFICE VISIT (OUTPATIENT)
Dept: URGENT CARE | Facility: CLINIC | Age: 49
End: 2024-07-27
Payer: COMMERCIAL

## 2024-07-27 VITALS
DIASTOLIC BLOOD PRESSURE: 67 MMHG | HEIGHT: 65 IN | BODY MASS INDEX: 32.51 KG/M2 | HEART RATE: 70 BPM | RESPIRATION RATE: 16 BRPM | TEMPERATURE: 99 F | WEIGHT: 195.13 LBS | SYSTOLIC BLOOD PRESSURE: 121 MMHG | OXYGEN SATURATION: 99 %

## 2024-07-27 DIAGNOSIS — R05.9 COUGH, UNSPECIFIED TYPE: ICD-10-CM

## 2024-07-27 DIAGNOSIS — J98.8 VIRAL RESPIRATORY ILLNESS: Primary | ICD-10-CM

## 2024-07-27 DIAGNOSIS — R06.2 WHEEZING: ICD-10-CM

## 2024-07-27 DIAGNOSIS — B97.89 VIRAL RESPIRATORY ILLNESS: Primary | ICD-10-CM

## 2024-07-27 LAB
CTP QC/QA: YES
SARS-COV-2 AG RESP QL IA.RAPID: NEGATIVE

## 2024-07-27 PROCEDURE — 87811 SARS-COV-2 COVID19 W/OPTIC: CPT | Mod: QW,S$GLB,,

## 2024-07-27 PROCEDURE — 99203 OFFICE O/P NEW LOW 30 MIN: CPT | Mod: S$GLB,,,

## 2024-07-27 RX ORDER — METHYLPREDNISOLONE 4 MG/1
TABLET ORAL
Qty: 21 EACH | Refills: 0 | Status: SHIPPED | OUTPATIENT
Start: 2024-07-27 | End: 2024-08-17

## 2024-07-27 RX ORDER — PROMETHAZINE HYDROCHLORIDE AND DEXTROMETHORPHAN HYDROBROMIDE 6.25; 15 MG/5ML; MG/5ML
5 SYRUP ORAL EVERY 8 HOURS PRN
Qty: 120 ML | Refills: 0 | Status: SHIPPED | OUTPATIENT
Start: 2024-07-27

## 2024-07-27 NOTE — PROGRESS NOTES
"Subjective:      Patient ID: Ladonna Meraz is a 49 y.o. female.    Vitals:  height is 5' 5" (1.651 m) and weight is 88.5 kg (195 lb 1.7 oz). Her oral temperature is 98.8 °F (37.1 °C). Her blood pressure is 121/67 and her pulse is 70. Her respiration is 16 and oxygen saturation is 99%.     Chief Complaint: Cough    Pt states she has been having chest congestion and a cough for 3 days.     Cough  This is a new problem. The current episode started in the past 7 days. The problem has been unchanged. The problem occurs every few hours. The cough is Productive of sputum. Associated symptoms include ear pain, postnasal drip and wheezing. The symptoms are aggravated by lying down. Risk factors for lung disease include smoking/tobacco exposure. Treatments tried: muscinex. The treatment provided mild relief. Her past medical history is significant for bronchitis.       Constitution: Positive for fatigue.   HENT:  Positive for ear pain, congestion and postnasal drip.    Neck: neck negative.   Cardiovascular: Negative.    Eyes: Negative.    Respiratory:  Positive for cough and wheezing.    Endocrine: negative.   Genitourinary: Negative.    Musculoskeletal: Negative.    Skin: Negative.    Allergic/Immunologic: Negative.    Neurological: Negative.    Hematologic/Lymphatic: Negative.    Psychiatric/Behavioral: Negative.        Objective:     Physical Exam   Constitutional: She is oriented to person, place, and time.   HENT:   Head: Normocephalic and atraumatic.   Ears:   Right Ear: Tympanic membrane, external ear and ear canal normal.   Left Ear: Tympanic membrane, external ear and ear canal normal.   Nose: Nose normal.   Mouth/Throat: Posterior oropharyngeal erythema present.   Eyes: Conjunctivae are normal. Pupils are equal, round, and reactive to light. Extraocular movement intact   Neck: Neck supple.   Cardiovascular: Normal rate, regular rhythm, normal heart sounds and normal pulses.   Pulmonary/Chest: Effort normal and " breath sounds normal. She has no wheezes.   Abdominal: Normal appearance.   Musculoskeletal: Normal range of motion.         General: Normal range of motion.   Neurological: no focal deficit. She is alert, oriented to person, place, and time and at baseline.   Skin: Skin is warm.   Psychiatric: Her behavior is normal. Mood, judgment and thought content normal.   Nursing note and vitals reviewed.      Assessment:     1. Viral respiratory illness    2. Cough, unspecified type    3. Wheezing    XR CHEST PA AND LATERAL     CLINICAL HISTORY:  Cough, unspecified     TECHNIQUE:  PA and lateral views of the chest were performed.     COMPARISON:  None     FINDINGS:  Normal cardiomediastinal silhouette.  Lungs appear clear.  No focal airspace opacity, pleural effusion, or pneumothorax.  Osseous structures appear intact.     Impression:     No acute cardiopulmonary process.    Results for orders placed or performed in visit on 07/27/24   SARS Coronavirus 2 Antigen, POCT Manual Read   Result Value Ref Range    SARS Coronavirus 2 Antigen Negative Negative     Acceptable Yes         Plan:       Viral respiratory illness  -     promethazine-dextromethorphan (PROMETHAZINE-DM) 6.25-15 mg/5 mL Syrp; Take 5 mLs by mouth every 8 (eight) hours as needed (cough).  Dispense: 120 mL; Refill: 0  -     methylPREDNISolone (MEDROL DOSEPACK) 4 mg tablet; use as directed  Dispense: 21 each; Refill: 0    Cough, unspecified type  -     SARS Coronavirus 2 Antigen, POCT Manual Read  -     XR CHEST PA AND LATERAL; Future; Expected date: 07/27/2024  -     promethazine-dextromethorphan (PROMETHAZINE-DM) 6.25-15 mg/5 mL Syrp; Take 5 mLs by mouth every 8 (eight) hours as needed (cough).  Dispense: 120 mL; Refill: 0  -     methylPREDNISolone (MEDROL DOSEPACK) 4 mg tablet; use as directed  Dispense: 21 each; Refill: 0    Wheezing  -     XR CHEST PA AND LATERAL; Future; Expected date: 07/27/2024  -     promethazine-dextromethorphan  (PROMETHAZINE-DM) 6.25-15 mg/5 mL Syrp; Take 5 mLs by mouth every 8 (eight) hours as needed (cough).  Dispense: 120 mL; Refill: 0  -     methylPREDNISolone (MEDROL DOSEPACK) 4 mg tablet; use as directed  Dispense: 21 each; Refill: 0

## 2024-07-27 NOTE — LETTER
July 27, 2024      [facility] - [address] [employer], [address]  [place] [address]  Phone: [phone]  Fax: [phone]       Patient: Ladonna Meraz   YOB: 1975  Date of Visit: 07/27/2024    To Whom It May Concern:    Tatyana Meraz  was at [facility] on 07/27/2024. The patient may return to work/school on 07/29/2024 with no restrictions. If you have any questions or concerns, or if I can be of further assistance, please do not hesitate to contact me.    Sincerely,    Milady Diallo, NP

## 2024-08-29 ENCOUNTER — TELEPHONE (OUTPATIENT)
Dept: FAMILY MEDICINE | Facility: CLINIC | Age: 49
End: 2024-08-29
Payer: COMMERCIAL

## 2024-08-29 NOTE — TELEPHONE ENCOUNTER
----- Message from Fely Waggoner sent at 8/29/2024 12:57 PM CDT -----  Type:  RX Refill Request    Who Called: PT   Refill or New Rx:REFILL   RX Name and Strength:   1. lisinopriL-hydrochlorothiazide (PRINZIDE,ZESTORETIC) 10-12.5 mg per tablet  2. atorvastatin (LIPITOR) 40 MG tablet  How is the patient currently taking it? (ex. 1XDay):BOTH RX ARE .... Take 1 tablet by mouth once daily. - Oral  Is this a 30 day or 90 day RX:90  Preferred Pharmacy with phone number:   [facility], [place] - [address] [address]  Phone: [phone] Fax: [phone]  Local or Mail Order:LOCAL  Ordering Provider Yoseph Martinez MD  Would the patient rather a call back or a response via [facility]? CALL   Best Call Back Number:[phone]   Additional Information: THANK YOU

## 2024-08-29 NOTE — TELEPHONE ENCOUNTER
----- Message from Fely Edilson sent at 8/29/2024 12:52 PM CDT -----  Contact: PT  Type:  Apoointment Request    Name of Caller:PT   When is the first available appointment? N/A  Symptoms: RX REFILLS   Would the patient rather a call back or a response via [facility]? CALL   Best Call Back Number:[phone] (home)   Additional Information: UNABLE TO SCHEDULE APPT - PT HAS [employer] EPO   THANK YOU

## 2024-08-29 NOTE — TELEPHONE ENCOUNTER
Attempted to contact Ladonna Meraz to discuss Scheduling an appointment.    Unable to leave message on phone - no voicemail or mailbox full on [phone] (home).    Mabel Hilliard LPN

## 2024-09-01 ENCOUNTER — OFFICE VISIT (OUTPATIENT)
Dept: URGENT CARE | Facility: CLINIC | Age: 49
End: 2024-09-01
Payer: COMMERCIAL

## 2024-09-01 VITALS
HEART RATE: 87 BPM | HEIGHT: 64 IN | BODY MASS INDEX: 33.63 KG/M2 | DIASTOLIC BLOOD PRESSURE: 81 MMHG | TEMPERATURE: 98 F | WEIGHT: 197 LBS | OXYGEN SATURATION: 97 % | RESPIRATION RATE: 17 BRPM | SYSTOLIC BLOOD PRESSURE: 136 MMHG

## 2024-09-01 DIAGNOSIS — Z76.0 MEDICATION REFILL: ICD-10-CM

## 2024-09-01 DIAGNOSIS — H66.93 BILATERAL OTITIS MEDIA, UNSPECIFIED OTITIS MEDIA TYPE: Primary | ICD-10-CM

## 2024-09-01 PROCEDURE — 99213 OFFICE O/P EST LOW 20 MIN: CPT | Mod: S$GLB,,, | Performed by: NURSE PRACTITIONER

## 2024-09-01 RX ORDER — LISINOPRIL AND HYDROCHLOROTHIAZIDE 10; 12.5 MG/1; MG/1
1 TABLET ORAL DAILY
Qty: 30 TABLET | Refills: 0 | Status: SHIPPED | OUTPATIENT
Start: 2024-09-01 | End: 2024-10-01

## 2024-09-01 RX ORDER — ATORVASTATIN CALCIUM 40 MG/1
40 TABLET, FILM COATED ORAL DAILY
Qty: 90 TABLET | Refills: 3 | Status: SHIPPED | OUTPATIENT
Start: 2024-09-01 | End: 2025-09-01

## 2024-09-01 RX ORDER — MELOXICAM 15 MG/1
15 TABLET ORAL
COMMUNITY
Start: 2024-08-13

## 2024-09-01 RX ORDER — AMOXICILLIN 500 MG/1
500 CAPSULE ORAL EVERY 8 HOURS
Qty: 30 CAPSULE | Refills: 0 | Status: SHIPPED | OUTPATIENT
Start: 2024-09-01 | End: 2024-09-11

## 2024-09-01 NOTE — PROGRESS NOTES
"Subjective:      Patient ID: Ladonna Meraz is a 49 y.o. female.    Vitals:  height is 5' 4" (1.626 m) and weight is 89.4 kg (197 lb). Her oral temperature is 98.1 °F (36.7 °C). Her blood pressure is 136/81 and her pulse is 87. Her respiration is 17 and oxygen saturation is 97%.     Chief Complaint: Medication Refill (Needs a refill on her lisinopril and atorvastatin, has been out her meds since thursday)    49 y.o. female who presents today with a chief complaint of: Needs a refill on her lisinopril and atorvastatin. She reports that she has been out her meds since last Thursday. She also reports bilateral ear ache.      Medication Refill  This is a new problem. The current episode started today.       HENT:  Positive for ear pain.       Objective:     Physical Exam   Constitutional: She is oriented to person, place, and time. obesity  HENT:   Head: Normocephalic and atraumatic.   Ears:   Right Ear: External ear and ear canal normal.   Left Ear: External ear and ear canal normal.      Comments: + erythema/bulging bilateral TM's.  Nose: Nose normal.   Mouth/Throat: Mucous membranes are moist. No posterior oropharyngeal erythema. Oropharynx is clear.   Eyes: Conjunctivae are normal. Pupils are equal, round, and reactive to light. Extraocular movement intact   Neck: Neck supple. No neck rigidity present.   Cardiovascular: Normal rate, regular rhythm, normal heart sounds and normal pulses.   Pulmonary/Chest: Effort normal and breath sounds normal.   Abdominal: Normal appearance.   Musculoskeletal: Normal range of motion.         General: Normal range of motion.   Lymphadenopathy:     She has no cervical adenopathy.   Neurological: She is alert and oriented to person, place, and time.   Skin: Skin is warm, dry and no rash.   Psychiatric: Her behavior is normal.   Vitals reviewed.      Assessment:     1. Bilateral otitis media, unspecified otitis media type    2. Medication refill        Plan:       Bilateral otitis " media, unspecified otitis media type  -     amoxicillin (AMOXIL) 500 MG capsule; Take 1 capsule (500 mg total) by mouth every 8 (eight) hours. for 10 days  Dispense: 30 capsule; Refill: 0    Medication refill  -     lisinopriL-hydrochlorothiazide (PRINZIDE,ZESTORETIC) 10-12.5 mg per tablet; Take 1 tablet by mouth once daily.  Dispense: 30 tablet; Refill: 0  -     atorvastatin (LIPITOR) 40 MG tablet; Take 1 tablet (40 mg total) by mouth once daily.  Dispense: 90 tablet; Refill: 3      INSTRUCTIONS  Meds as prescribed. Follow up as advised.